# Patient Record
Sex: MALE | Race: WHITE | NOT HISPANIC OR LATINO | Employment: FULL TIME | URBAN - METROPOLITAN AREA
[De-identification: names, ages, dates, MRNs, and addresses within clinical notes are randomized per-mention and may not be internally consistent; named-entity substitution may affect disease eponyms.]

---

## 2020-03-17 ENCOUNTER — CONSULT (OUTPATIENT)
Dept: PAIN MEDICINE | Facility: CLINIC | Age: 65
End: 2020-03-17
Payer: OTHER MISCELLANEOUS

## 2020-03-17 ENCOUNTER — TELEPHONE (OUTPATIENT)
Dept: PAIN MEDICINE | Facility: CLINIC | Age: 65
End: 2020-03-17

## 2020-03-17 VITALS
SYSTOLIC BLOOD PRESSURE: 120 MMHG | HEART RATE: 77 BPM | WEIGHT: 243 LBS | BODY MASS INDEX: 34.02 KG/M2 | HEIGHT: 71 IN | DIASTOLIC BLOOD PRESSURE: 70 MMHG

## 2020-03-17 DIAGNOSIS — M50.223 HERNIATION OF INTERVERTEBRAL DISC AT C6-C7 LEVEL: Primary | ICD-10-CM

## 2020-03-17 DIAGNOSIS — M54.12 CERVICAL RADICULOPATHY: ICD-10-CM

## 2020-03-17 DIAGNOSIS — M62.838 NECK MUSCLE SPASM: ICD-10-CM

## 2020-03-17 PROCEDURE — 99244 OFF/OP CNSLTJ NEW/EST MOD 40: CPT | Performed by: ANESTHESIOLOGY

## 2020-03-17 RX ORDER — MONTELUKAST SODIUM 10 MG/1
TABLET ORAL
COMMUNITY
Start: 2020-03-08

## 2020-03-17 RX ORDER — MOMETASONE FUROATE 220 UG/1
INHALANT RESPIRATORY (INHALATION)
COMMUNITY
Start: 2020-03-16

## 2020-03-17 NOTE — TELEPHONE ENCOUNTER
[facility] [employer]  P O   [address] [facility] [address]    Claim# [id number]    DOI 3-7-20

## 2020-03-17 NOTE — PROGRESS NOTES
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    2  Cervical radiculopathy    3  Neck muscle spasm        Plan  The patient's pain persists despite time, relative rest, activity modification and oral steroid  I believe that he would benefit from cervical epidural steroid injection to directly diminish any inflammatory component of his pain  I will initially use an translaminar approach  If the patient does not receive significant pain relief following the initial injection, I may need to repeat using a transforaminal approach that may allow for better concentrate of the steroid along the affected nerve root  In addition will have the patient undergo a course of physical therapy, referred him to Dr Ferguson  In the office today, we reviewed the nature of the patient's pathology in depth using  diagrams and models  I discussed the approach I would use for the epidural steroid injection and provided literature for home review  The patient understands the risks associated with the procedure including but not limited to bleeding, infection, tissue injury, exacerbation of symptoms, allergic reaction, spinal headache, and paralysis and provided written and verbal consent  today  My impressions and treatment recommendations were discussed in detail with the patient who verbalized understanding and had no further questions  Discharge instructions were provided  I personally saw and examined the patient and I agree with the above discussed plan of care  This note is created using dictation transcription  It may contain typographical errors, grammatical errors, improperly dictated words, background noise and other errors      Orders Placed This Encounter   Procedures    FL spine and pain procedure     Standing Status:   Future     Standing Expiration Date:   3/17/2024     Order Specific Question:   Reason for Exam:     Answer:   IVAN to the left 1     Order Specific Question:   Anticoagulant hold needed? Answer:   no    FL spine and pain procedure     Standing Status:   Future     Standing Expiration Date:   3/17/2024     Order Specific Question:   Reason for Exam:     Answer:   IVAN to the left 2     Order Specific Question:   Anticoagulant hold needed? Answer:   no    Ambulatory referral to Physical Therapy     Standing Status:   Future     Standing Expiration Date:   3/17/2021     Referral Priority:   Routine     Referral Type:   Physical Therapy     Referral Reason:   Specialty Services Required     Referred to Provider:   Nicolle Dawson PT     Requested Specialty:   Physical Therapy     Number of Visits Requested:   1     Expiration Date:   3/17/2021     New Medications Ordered This Visit   Medications    ASMANEX, 30 METERED DOSES, 220 MCG/INH inhaler    montelukast (SINGULAIR) 10 mg tablet     REFERRED BY ANTONIA ALFONSO     History of Present Illness    Latrice Ardon is a 59 y o  male with greater than two week history of neck and left arm pain  He felt this occurred at work as 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  His pain is moderate to severe is nearly constant worse in the afternoon describes shooting and sharp with no weakness of the upper lower limbs  He reports that lying down sitting and relaxation decreases symptoms while bending and walking aggravate them  I have personally reviewed and/or updated the patient's past medical history, past surgical history, family history, social history, current medications, allergies, and vital signs today  Review of Systems   Constitutional: Negative for fever and unexpected weight change  HENT: Negative for trouble swallowing  Eyes: Negative for visual disturbance  Respiratory: Negative for shortness of breath and wheezing  Cardiovascular: Negative for chest pain and palpitations  Gastrointestinal: Positive for diarrhea  Negative for constipation, nausea and vomiting     Endocrine: Negative for cold intolerance, heat intolerance and polydipsia  Genitourinary: Negative for difficulty urinating and frequency  Musculoskeletal: Negative for arthralgias, gait problem, joint swelling and myalgias  Skin: Negative for rash  Neurological: Negative for dizziness, seizures, syncope, weakness and headaches  Hematological: Does not bruise/bleed easily  Psychiatric/Behavioral: Negative for dysphoric mood  All other systems reviewed and are negative  There is no problem list on file for this patient  History reviewed  No pertinent past medical history  History reviewed  No pertinent surgical history  History reviewed  No pertinent family history  Social History     Occupational History    Not on file   Tobacco Use    Smoking status: Never Smoker    Smokeless tobacco: Never Used   Substance and Sexual Activity    Alcohol use: Yes     Frequency: Monthly or less     Drinks per session: 1 or 2     Binge frequency: Never    Drug use: Never    Sexual activity: Not Currently       Current Outpatient Medications on File Prior to Visit   Medication Sig    ASMANEX, 30 METERED DOSES, 220 MCG/INH inhaler     montelukast (SINGULAIR) 10 mg tablet      No current facility-administered medications on file prior to visit  No Known Allergies    Physical Exam    /70   Pulse 77   Ht 5' 11" (1 803 m)   Wt 110 kg (243 lb)   BMI 33 89 kg/m²     Constitutional: normal, well developed, well nourished, alert, in no distress and non-toxic and no overt pain behavior  and obese  Eyes: anicteric  HEENT: grossly intact  Neck: supple, symmetric, trachea midline and no masses   Pulmonary:even and unlabored  Cardiovascular:No edema or pitting edema present  Skin:Normal without rashes or lesions and well hydrated  Psychiatric:Mood and affect appropriate  Neurologic:Cranial Nerves II-XII grossly intact  Musculoskeletal:normal,   Inspection:  Normal station and gait    Mild flattening of the cervical lordosis  Skin intact without erythema  No sensory loss  There is no atrophy  Palpation:  There is tenderness to palpation overlying the bilateral cervical paraspinals, cervical facet joints  There is no tenderness over the upper trapezius muscles bilateral  No shoulder tenderness  Motor/Strength:  5/5 strength in the bilateral upper extremities  Reflexes:  equal and symmetric in the upper limbs  Sensation:   Sensation intact to light touch and pinprick in the upper limbs  Maneuvers:  Positive Spurling maneuver on the left  Negative Lhermitte's sign  Imaging  [facility] 3/12/20  [facility] Cervical Spine   Impression:  1  Congenital canal narrowing  2  Reversal of the lordosis suggesting spasm  3  Moderate canal stenosis and moderate bilateral foraminal stenosis C5-6 secondary to a central herniation of indeterminate age  4  Broad disc bulge with a left paracentral herniation C6-7 indeterminate age causing sever left foraminal stenosis and mass effect upon the left side of the cord   There is mild to moderate central canal stenosis and moderate right foraminal stenosis at  this level

## 2020-05-04 ENCOUNTER — TELEPHONE (OUTPATIENT)
Dept: RADIOLOGY | Facility: CLINIC | Age: 65
End: 2020-05-04